# Patient Record
Sex: MALE | Race: WHITE | NOT HISPANIC OR LATINO | Employment: UNEMPLOYED | URBAN - NONMETROPOLITAN AREA
[De-identification: names, ages, dates, MRNs, and addresses within clinical notes are randomized per-mention and may not be internally consistent; named-entity substitution may affect disease eponyms.]

---

## 2023-12-14 ENCOUNTER — HOSPITAL ENCOUNTER (EMERGENCY)
Facility: HOSPITAL | Age: 48
Discharge: HOME | End: 2023-12-14
Attending: EMERGENCY MEDICINE
Payer: COMMERCIAL

## 2023-12-14 VITALS
OXYGEN SATURATION: 99 % | HEIGHT: 72 IN | SYSTOLIC BLOOD PRESSURE: 129 MMHG | BODY MASS INDEX: 33.86 KG/M2 | HEART RATE: 68 BPM | WEIGHT: 250 LBS | TEMPERATURE: 97 F | RESPIRATION RATE: 18 BRPM | DIASTOLIC BLOOD PRESSURE: 94 MMHG

## 2023-12-14 DIAGNOSIS — S61.022A LACERATION OF LEFT THUMB WITH FOREIGN BODY WITHOUT DAMAGE TO NAIL, INITIAL ENCOUNTER: Primary | ICD-10-CM

## 2023-12-14 PROCEDURE — 12002 RPR S/N/AX/GEN/TRNK2.6-7.5CM: CPT | Performed by: EMERGENCY MEDICINE

## 2023-12-14 PROCEDURE — 99282 EMERGENCY DEPT VISIT SF MDM: CPT | Performed by: EMERGENCY MEDICINE

## 2023-12-14 ASSESSMENT — PAIN - FUNCTIONAL ASSESSMENT
PAIN_FUNCTIONAL_ASSESSMENT: 0-10

## 2023-12-14 ASSESSMENT — COLUMBIA-SUICIDE SEVERITY RATING SCALE - C-SSRS
6. HAVE YOU EVER DONE ANYTHING, STARTED TO DO ANYTHING, OR PREPARED TO DO ANYTHING TO END YOUR LIFE?: NO
1. IN THE PAST MONTH, HAVE YOU WISHED YOU WERE DEAD OR WISHED YOU COULD GO TO SLEEP AND NOT WAKE UP?: NO
1. IN THE PAST MONTH, HAVE YOU WISHED YOU WERE DEAD OR WISHED YOU COULD GO TO SLEEP AND NOT WAKE UP?: NO
2. HAVE YOU ACTUALLY HAD ANY THOUGHTS OF KILLING YOURSELF?: NO
6. HAVE YOU EVER DONE ANYTHING, STARTED TO DO ANYTHING, OR PREPARED TO DO ANYTHING TO END YOUR LIFE?: NO
2. HAVE YOU ACTUALLY HAD ANY THOUGHTS OF KILLING YOURSELF?: NO

## 2023-12-14 ASSESSMENT — PAIN SCALES - GENERAL
PAINLEVEL_OUTOF10: 1
PAINLEVEL_OUTOF10: 1
PAINLEVEL_OUTOF10: 0 - NO PAIN

## 2023-12-14 ASSESSMENT — PAIN DESCRIPTION - DESCRIPTORS: DESCRIPTORS: TENDER

## 2023-12-14 ASSESSMENT — PAIN DESCRIPTION - ONSET: ONSET: SUDDEN

## 2023-12-14 ASSESSMENT — PAIN DESCRIPTION - PAIN TYPE: TYPE: ACUTE PAIN

## 2023-12-14 ASSESSMENT — PAIN DESCRIPTION - PROGRESSION: CLINICAL_PROGRESSION: NOT CHANGED

## 2023-12-14 ASSESSMENT — PAIN DESCRIPTION - FREQUENCY: FREQUENCY: CONSTANT/CONTINUOUS

## 2023-12-15 NOTE — ED PROVIDER NOTES
History:  Chief Complaint   Patient presents with    Finger Laceration     Shaving spoon with knife, + bleeding oozing.     HPI    History of Present Illness:  HPI    History of Present Illness:    Patient information was obtained from: Patient  History/exam Limitations: None  Patient resented to the Emergency Department: Private vehicle    Chief Complaint: Thumb laceration    HPI:  Kaleb Brush, 48 y.o. male presents today with: Patient presents with a complaint of a left thumb laceration.  Got it from a knife while trying to carve a spoon.  Tetanus is up-to-date.  Symptoms are mild and constant.    Past Medical History:    Past Medical History:   Diagnosis Date    Diabetes mellitus (CMS/[facility])     Familial hypercholesterolemia due to genetic disorder of apolipoprotein b     Hypertension        History reviewed. No pertinent surgical history.    No family history on file.    Allergies   Allergen Reactions    Ampicillin Hives    Penicillins Hives       Social History     Tobacco Use    Smoking status: Never    Smokeless tobacco: Never   Vaping Use    Vaping Use: Never used   Substance Use Topics    Drug use: Never     Past medical, surgical, social, and family history that was noted in the nursing note was also reviewed.    ROS:  Review of Systems    Physical Exam:  ED Triage Vitals [12/14/23 2242]   Temp Heart Rate Resp BP   36.1 °C (97 °F) 68 18 (!) 129/94      SpO2 Temp Source Heart Rate Source Patient Position   99 % Tympanic Monitor Lying      BP Location FiO2 (%)     Right arm --       Vitals: Blood pressure (!) 129/94, pulse 68, temperature 36.1 °C (97 °F), temperature source Tympanic, resp. rate 18, height 1.829 m (6'), weight 113 kg (250 lb), SpO2 99 %.  Vitals:    12/14/23 2242 12/14/23 2249   BP: (!) 129/94    BP Location: Right arm    Patient Position: Lying    Pulse: 68    Resp: 18    Temp: 36.1 °C (97 °F)    TempSrc: Tympanic    SpO2: 99%    Weight: 113 kg (250 lb) 113 kg (250 lb)   Height: 1.829 m  (6') 1.829 m (6')       Physical Exam  Vitals and nursing note reviewed.   Constitutional:       Appearance: Normal appearance.   Cardiovascular:      Rate and Rhythm: Normal rate and regular rhythm.      Pulses: Normal pulses.   Pulmonary:      Effort: Pulmonary effort is normal. No respiratory distress.   Musculoskeletal:         General: Normal range of motion.   Skin:     General: Skin is warm and dry.      Comments: 3 cm kind of curved laceration just going in front of the left thumb nail.   Neurological:      General: No focal deficit present.      Mental Status: He is alert and oriented to person, place, and time.   Psychiatric:         Mood and Affect: Mood normal.         ED Course:  Diagnostic test reviewed:  Diagnoses as of 12/14/23 2301   Laceration of left thumb with foreign body without damage to nail, initial encounter          Laceration Repair    Performed by: Wilian Baez DO  Authorized by: Wilian Baez DO    Consent:     Consent obtained:  Verbal    Risks discussed:  Infection and pain  Universal protocol:     Patient identity confirmed:  Verbally with patient and arm band  Laceration details:     Location:  Finger    Finger location:  L thumb    Length (cm):  3  Pre-procedure details:     Preparation:  Patient was prepped and draped in usual sterile fashion  Exploration:     Hemostasis achieved with:  Direct pressure    Wound extent: no nerve damage noted, no tendon damage noted and no vascular damage noted    Treatment:     Area cleansed with:  Soap and water    Amount of cleaning:  Standard    Irrigation solution:  Tap water    Visualized foreign bodies/material removed: no    Skin repair:     Repair method:  Tissue adhesive  Approximation:     Approximation:  Close  Repair type:     Repair type:  Simple  Post-procedure details:     Dressing:  Adhesive bandage    Procedure completion:  Tolerated          Administrated medications:  Medications - No data to display    New Prescriptions     No medications on file       Test ordered and reviewed:  Labs Reviewed - No data to display    No orders to display       ED Impression:  1. Laceration of left thumb with foreign body without damage to nail, initial encounter            Critical care time: 0 (Zero) minutes.    Medical Decision Making  Stable.  Wound closed with Dermabond.  Patient tolerated well.  Bleeding controlled.  Tetanus is currently up-to-date.  Will discharge home.  I will discharge the patient home.  Discussed the results of the exam and/or testing.  Written instructions provided.  Discussed return reasons, patient verbalizes understanding.  Stress follow-up with PCP.    Problems Addressed:  Laceration of left thumb with foreign body without damage to nail, initial encounter: acute illness or injury    Amount and/or Complexity of Data Reviewed  External Data Reviewed: notes.     Details: 9/12/2023 primary care note reviewed.    Primary care note reviewed    Diagnosis Ruled In: See clinical impression above.  Diagnoses Ruled Out: Sepsis and Trauma    History Obtained From: Patient    Test interpretations: See ED course if present.    Consideration for tests/treatments/admission not undertaken: None    Social Determinants of Health: None    MDM  Reviewed: nursing note, vitals and previous chart        No follow-ups on file.    Wilian Adair DO  12/14/2023  Attending Emergency Physician    Clinician of Record Attestation: I, Dr. Wilian Adair DO, am the primary clinician of record.    Please note this report has been produced using speech recognition software, and may contain errors related to that system - Including errors in grammar, punctuation, and spelling, as well as words and phrases that may be inappropriate. If there are any questions or concerns, please contact the dictating physician/physician assistant for clarification.                 Wilian Adair DO  12/14/23 4046

## 2025-01-31 ENCOUNTER — HOSPITAL ENCOUNTER (EMERGENCY)
Facility: HOSPITAL | Age: 50
Discharge: HOME | End: 2025-02-01
Attending: FAMILY MEDICINE
Payer: COMMERCIAL

## 2025-01-31 ENCOUNTER — APPOINTMENT (OUTPATIENT)
Dept: CARDIOLOGY | Facility: HOSPITAL | Age: 50
End: 2025-01-31
Payer: COMMERCIAL

## 2025-01-31 ENCOUNTER — APPOINTMENT (OUTPATIENT)
Dept: RADIOLOGY | Facility: HOSPITAL | Age: 50
End: 2025-01-31
Payer: COMMERCIAL

## 2025-01-31 DIAGNOSIS — R42 DIZZINESS: Primary | ICD-10-CM

## 2025-01-31 DIAGNOSIS — R11.2 NAUSEA AND VOMITING, UNSPECIFIED VOMITING TYPE: ICD-10-CM

## 2025-01-31 LAB
ALBUMIN SERPL BCP-MCNC: 4.8 G/DL (ref 3.4–5)
ALP SERPL-CCNC: 49 U/L (ref 33–120)
ALT SERPL W P-5'-P-CCNC: 39 U/L (ref 10–52)
ANION GAP SERPL CALC-SCNC: 16 MMOL/L (ref 10–20)
AST SERPL W P-5'-P-CCNC: 21 U/L (ref 9–39)
BASOPHILS # BLD AUTO: 0.03 X10*3/UL (ref 0–0.1)
BASOPHILS NFR BLD AUTO: 0.2 %
BILIRUB SERPL-MCNC: 0.7 MG/DL (ref 0–1.2)
BUN SERPL-MCNC: 13 MG/DL (ref 6–23)
CALCIUM SERPL-MCNC: 9.5 MG/DL (ref 8.6–10.3)
CHLORIDE SERPL-SCNC: 101 MMOL/L (ref 98–107)
CO2 SERPL-SCNC: 25 MMOL/L (ref 21–32)
CREAT SERPL-MCNC: 0.84 MG/DL (ref 0.5–1.3)
EGFRCR SERPLBLD CKD-EPI 2021: >90 ML/MIN/1.73M*2
EOSINOPHIL # BLD AUTO: 0.09 X10*3/UL (ref 0–0.7)
EOSINOPHIL NFR BLD AUTO: 0.6 %
ERYTHROCYTE [DISTWIDTH] IN BLOOD BY AUTOMATED COUNT: 11.8 % (ref 11.5–14.5)
FLUAV RNA RESP QL NAA+PROBE: NOT DETECTED
FLUBV RNA RESP QL NAA+PROBE: NOT DETECTED
GLUCOSE BLD MANUAL STRIP-MCNC: 159 MG/DL (ref 74–99)
GLUCOSE SERPL-MCNC: 148 MG/DL (ref 74–99)
HCT VFR BLD AUTO: 45.6 % (ref 41–52)
HGB BLD-MCNC: 15.4 G/DL (ref 13.5–17.5)
HOLD SPECIMEN: NORMAL
IMM GRANULOCYTES # BLD AUTO: 0.06 X10*3/UL (ref 0–0.7)
IMM GRANULOCYTES NFR BLD AUTO: 0.4 % (ref 0–0.9)
LIPASE SERPL-CCNC: 43 U/L (ref 9–82)
LYMPHOCYTES # BLD AUTO: 4.19 X10*3/UL (ref 1.2–4.8)
LYMPHOCYTES NFR BLD AUTO: 27.3 %
MAGNESIUM SERPL-MCNC: 1.99 MG/DL (ref 1.6–2.4)
MCH RBC QN AUTO: 29.5 PG (ref 26–34)
MCHC RBC AUTO-ENTMCNC: 33.8 G/DL (ref 32–36)
MCV RBC AUTO: 87 FL (ref 80–100)
MONOCYTES # BLD AUTO: 0.91 X10*3/UL (ref 0.1–1)
MONOCYTES NFR BLD AUTO: 5.9 %
NEUTROPHILS # BLD AUTO: 10.09 X10*3/UL (ref 1.2–7.7)
NEUTROPHILS NFR BLD AUTO: 65.6 %
NRBC BLD-RTO: 0 /100 WBCS (ref 0–0)
PLATELET # BLD AUTO: 351 X10*3/UL (ref 150–450)
POTASSIUM SERPL-SCNC: 4 MMOL/L (ref 3.5–5.3)
PROT SERPL-MCNC: 7.9 G/DL (ref 6.4–8.2)
RBC # BLD AUTO: 5.22 X10*6/UL (ref 4.5–5.9)
SARS-COV-2 RNA RESP QL NAA+PROBE: NOT DETECTED
SODIUM SERPL-SCNC: 138 MMOL/L (ref 136–145)
WBC # BLD AUTO: 15.4 X10*3/UL (ref 4.4–11.3)

## 2025-01-31 PROCEDURE — 2500000004 HC RX 250 GENERAL PHARMACY W/ HCPCS (ALT 636 FOR OP/ED): Performed by: FAMILY MEDICINE

## 2025-01-31 PROCEDURE — 74022 RADEX COMPL AQT ABD SERIES: CPT

## 2025-01-31 PROCEDURE — 74021 RADEX ABDOMEN 3+ VIEWS: CPT | Performed by: RADIOLOGY

## 2025-01-31 PROCEDURE — 83690 ASSAY OF LIPASE: CPT | Performed by: FAMILY MEDICINE

## 2025-01-31 PROCEDURE — 83735 ASSAY OF MAGNESIUM: CPT | Performed by: FAMILY MEDICINE

## 2025-01-31 PROCEDURE — 96361 HYDRATE IV INFUSION ADD-ON: CPT

## 2025-01-31 PROCEDURE — 84075 ASSAY ALKALINE PHOSPHATASE: CPT | Performed by: FAMILY MEDICINE

## 2025-01-31 PROCEDURE — 85025 COMPLETE CBC W/AUTO DIFF WBC: CPT | Performed by: FAMILY MEDICINE

## 2025-01-31 PROCEDURE — 96374 THER/PROPH/DIAG INJ IV PUSH: CPT

## 2025-01-31 PROCEDURE — 74021 RADEX ABDOMEN 3+ VIEWS: CPT

## 2025-01-31 PROCEDURE — 2500000002 HC RX 250 W HCPCS SELF ADMINISTERED DRUGS (ALT 637 FOR MEDICARE OP, ALT 636 FOR OP/ED)

## 2025-01-31 PROCEDURE — 36415 COLL VENOUS BLD VENIPUNCTURE: CPT | Performed by: FAMILY MEDICINE

## 2025-01-31 PROCEDURE — 93005 ELECTROCARDIOGRAM TRACING: CPT

## 2025-01-31 PROCEDURE — 2500000004 HC RX 250 GENERAL PHARMACY W/ HCPCS (ALT 636 FOR OP/ED)

## 2025-01-31 PROCEDURE — 87636 SARSCOV2 & INF A&B AMP PRB: CPT | Performed by: FAMILY MEDICINE

## 2025-01-31 PROCEDURE — 96375 TX/PRO/DX INJ NEW DRUG ADDON: CPT

## 2025-01-31 PROCEDURE — 99285 EMERGENCY DEPT VISIT HI MDM: CPT | Mod: 25 | Performed by: FAMILY MEDICINE

## 2025-01-31 PROCEDURE — 82947 ASSAY GLUCOSE BLOOD QUANT: CPT

## 2025-01-31 RX ORDER — FAMOTIDINE 10 MG/ML
20 INJECTION INTRAVENOUS ONCE
Status: COMPLETED | OUTPATIENT
Start: 2025-01-31 | End: 2025-01-31

## 2025-01-31 RX ORDER — METFORMIN HYDROCHLORIDE 500 MG/1
500 TABLET ORAL
COMMUNITY

## 2025-01-31 RX ORDER — MECLIZINE HYDROCHLORIDE 25 MG/1
25 TABLET ORAL ONCE
Status: COMPLETED | OUTPATIENT
Start: 2025-01-31 | End: 2025-01-31

## 2025-01-31 RX ORDER — DIAZEPAM 5 MG/ML
5 INJECTION, SOLUTION INTRAMUSCULAR; INTRAVENOUS ONCE
Status: COMPLETED | OUTPATIENT
Start: 2025-01-31 | End: 2025-01-31

## 2025-01-31 RX ORDER — ONDANSETRON HYDROCHLORIDE 2 MG/ML
INJECTION, SOLUTION INTRAVENOUS
Status: COMPLETED
Start: 2025-01-31 | End: 2025-01-31

## 2025-01-31 RX ORDER — MECLIZINE HYDROCHLORIDE 25 MG/1
TABLET ORAL
Status: COMPLETED
Start: 2025-01-31 | End: 2025-01-31

## 2025-01-31 RX ORDER — LISINOPRIL 10 MG/1
10 TABLET ORAL DAILY
COMMUNITY

## 2025-01-31 RX ORDER — FAMOTIDINE 10 MG/ML
INJECTION INTRAVENOUS
Status: COMPLETED
Start: 2025-01-31 | End: 2025-01-31

## 2025-01-31 RX ORDER — ATORVASTATIN CALCIUM 10 MG/1
10 TABLET, FILM COATED ORAL DAILY
COMMUNITY

## 2025-01-31 RX ORDER — ONDANSETRON HYDROCHLORIDE 2 MG/ML
4 INJECTION, SOLUTION INTRAVENOUS ONCE
Status: COMPLETED | OUTPATIENT
Start: 2025-01-31 | End: 2025-01-31

## 2025-01-31 RX ADMIN — SODIUM CHLORIDE 1000 ML: 0.9 INJECTION, SOLUTION INTRAVENOUS at 19:37

## 2025-01-31 RX ADMIN — ONDANSETRON HYDROCHLORIDE 4 MG: 2 INJECTION, SOLUTION INTRAVENOUS at 19:40

## 2025-01-31 RX ADMIN — FAMOTIDINE 20 MG: 10 INJECTION, SOLUTION INTRAVENOUS at 19:42

## 2025-01-31 RX ADMIN — DIAZEPAM 5 MG: 5 INJECTION, SOLUTION INTRAMUSCULAR; INTRAVENOUS at 22:52

## 2025-01-31 RX ADMIN — ONDANSETRON 4 MG: 2 INJECTION INTRAMUSCULAR; INTRAVENOUS at 19:40

## 2025-01-31 RX ADMIN — FAMOTIDINE 20 MG: 10 INJECTION INTRAVENOUS at 19:42

## 2025-01-31 RX ADMIN — MECLIZINE HYDROCHLORIDE 25 MG: 25 TABLET ORAL at 20:03

## 2025-01-31 ASSESSMENT — COLUMBIA-SUICIDE SEVERITY RATING SCALE - C-SSRS
2. HAVE YOU ACTUALLY HAD ANY THOUGHTS OF KILLING YOURSELF?: NO
1. IN THE PAST MONTH, HAVE YOU WISHED YOU WERE DEAD OR WISHED YOU COULD GO TO SLEEP AND NOT WAKE UP?: NO
6. HAVE YOU EVER DONE ANYTHING, STARTED TO DO ANYTHING, OR PREPARED TO DO ANYTHING TO END YOUR LIFE?: NO

## 2025-01-31 ASSESSMENT — PAIN DESCRIPTION - LOCATION: LOCATION: ABDOMEN

## 2025-01-31 ASSESSMENT — PAIN - FUNCTIONAL ASSESSMENT: PAIN_FUNCTIONAL_ASSESSMENT: 0-10

## 2025-01-31 ASSESSMENT — PAIN SCALES - GENERAL: PAINLEVEL_OUTOF10: 2

## 2025-02-01 ENCOUNTER — APPOINTMENT (OUTPATIENT)
Dept: RADIOLOGY | Facility: HOSPITAL | Age: 50
End: 2025-02-01
Payer: COMMERCIAL

## 2025-02-01 VITALS
HEIGHT: 72 IN | RESPIRATION RATE: 18 BRPM | SYSTOLIC BLOOD PRESSURE: 131 MMHG | DIASTOLIC BLOOD PRESSURE: 80 MMHG | HEART RATE: 93 BPM | TEMPERATURE: 97.6 F | BODY MASS INDEX: 34.54 KG/M2 | WEIGHT: 255 LBS | OXYGEN SATURATION: 97 %

## 2025-02-01 LAB
APPEARANCE UR: CLEAR
BILIRUB UR STRIP.AUTO-MCNC: NEGATIVE MG/DL
COLOR UR: YELLOW
GLUCOSE UR STRIP.AUTO-MCNC: NEGATIVE MG/DL
HOLD SPECIMEN: NORMAL
KETONES UR STRIP.AUTO-MCNC: ABNORMAL MG/DL
LEUKOCYTE ESTERASE UR QL STRIP.AUTO: NEGATIVE
NITRITE UR QL STRIP.AUTO: NEGATIVE
PH UR STRIP.AUTO: 6 [PH]
PROT UR STRIP.AUTO-MCNC: NEGATIVE MG/DL
RBC # UR STRIP.AUTO: NEGATIVE /UL
SP GR UR STRIP.AUTO: 1.01
UROBILINOGEN UR STRIP.AUTO-MCNC: <2 MG/DL

## 2025-02-01 PROCEDURE — 81003 URINALYSIS AUTO W/O SCOPE: CPT | Performed by: FAMILY MEDICINE

## 2025-02-01 PROCEDURE — 70498 CT ANGIOGRAPHY NECK: CPT | Performed by: STUDENT IN AN ORGANIZED HEALTH CARE EDUCATION/TRAINING PROGRAM

## 2025-02-01 PROCEDURE — 2550000001 HC RX 255 CONTRASTS: Performed by: FAMILY MEDICINE

## 2025-02-01 PROCEDURE — 70496 CT ANGIOGRAPHY HEAD: CPT | Performed by: STUDENT IN AN ORGANIZED HEALTH CARE EDUCATION/TRAINING PROGRAM

## 2025-02-01 PROCEDURE — 2500000004 HC RX 250 GENERAL PHARMACY W/ HCPCS (ALT 636 FOR OP/ED)

## 2025-02-01 PROCEDURE — 70498 CT ANGIOGRAPHY NECK: CPT

## 2025-02-01 RX ORDER — ONDANSETRON 4 MG/1
4 TABLET, ORALLY DISINTEGRATING ORAL ONCE
Status: COMPLETED | OUTPATIENT
Start: 2025-02-01 | End: 2025-02-01

## 2025-02-01 RX ORDER — MECLIZINE HYDROCHLORIDE 25 MG/1
25 TABLET ORAL 3 TIMES DAILY PRN
Qty: 20 TABLET | Refills: 0 | Status: SHIPPED | OUTPATIENT
Start: 2025-02-01 | End: 2025-02-11

## 2025-02-01 RX ORDER — ONDANSETRON 4 MG/1
4 TABLET, ORALLY DISINTEGRATING ORAL EVERY 8 HOURS PRN
Qty: 20 TABLET | Refills: 0 | Status: SHIPPED | OUTPATIENT
Start: 2025-02-01 | End: 2025-02-08

## 2025-02-01 RX ORDER — ONDANSETRON 4 MG/1
TABLET, ORALLY DISINTEGRATING ORAL
Status: COMPLETED
Start: 2025-02-01 | End: 2025-02-01

## 2025-02-01 RX ADMIN — IOHEXOL 75 ML: 350 INJECTION, SOLUTION INTRAVENOUS at 00:43

## 2025-02-01 RX ADMIN — ONDANSETRON 4 MG: 4 TABLET, ORALLY DISINTEGRATING ORAL at 02:05

## 2025-02-01 ASSESSMENT — PAIN - FUNCTIONAL ASSESSMENT: PAIN_FUNCTIONAL_ASSESSMENT: 0-10

## 2025-02-01 ASSESSMENT — PAIN SCALES - GENERAL: PAINLEVEL_OUTOF10: 0 - NO PAIN

## 2025-02-01 NOTE — ED PROVIDER NOTES
HPI   Chief Complaint   Patient presents with    Vomiting     Vomiting dizziness diarrhea, started this morning       49-year-old male with history of diabetes, hypertension, and hyperlipidemia comes the ED with complaint of dizziness with nausea/vomiting that began yesterday morning and continued today.  Patient reports symptoms continue to worsen and he got a quick movement or standing up made the room spinning admitted very nauseous along the fall down.  Patient reports that this has not happened to her before and did not know what to do and has been brought to the ED.  Patient does add that prior to the onset he was getting over a head cold with some nasal congestion.  Patient in the ED is alert, cooperative, appears anxious, uncomfortable, but in no distress.  Patient reports no other associate symptoms or complaints.      History provided by:  Spouse, medical records and patient   used: No            Patient History   Past Medical History:   Diagnosis Date    Diabetes mellitus ([facility])     Familial hypercholesterolemia due to genetic disorder of apolipoprotein b     Hypertension      History reviewed. No pertinent surgical history.  No family history on file.  Social History     Tobacco Use    Smoking status: Never    Smokeless tobacco: Never   Vaping Use    Vaping status: Never Used   Substance Use Topics    Alcohol use: Not Currently    Drug use: Never       Physical Exam   ED Triage Vitals   Temperature Heart Rate Respirations BP   01/31/25 1906 01/31/25 1906 01/31/25 1906 01/31/25 1906   36.5 °C (97.7 °F) 89 18 127/81      SpO2 Temp Source Heart Rate Source Patient Position   01/31/25 1906 01/31/25 1906 01/31/25 2000 --   97 % Temporal Monitor       BP Location FiO2 (%)     -- --             Physical Exam  Vitals and nursing note reviewed.   Constitutional:       General: He is not in acute distress.     Appearance: He is well-developed.   HENT:      Head: Normocephalic and atraumatic.    Eyes:      Conjunctiva/sclera: Conjunctivae normal.   Cardiovascular:      Rate and Rhythm: Normal rate and regular rhythm.      Pulses: Normal pulses.      Heart sounds: Normal heart sounds, S1 normal and S2 normal. No murmur heard.  Pulmonary:      Effort: Pulmonary effort is normal. No respiratory distress.      Breath sounds: Normal breath sounds.   Abdominal:      Palpations: Abdomen is soft.      Tenderness: There is no abdominal tenderness.   Musculoskeletal:         General: No swelling.      Cervical back: Neck supple.   Skin:     General: Skin is warm and dry.      Capillary Refill: Capillary refill takes less than 2 seconds.   Neurological:      General: No focal deficit present.      Mental Status: He is alert and oriented to person, place, and time.      GCS: GCS eye subscore is 4. GCS verbal subscore is 5. GCS motor subscore is 6.      Cranial Nerves: Cranial nerves 2-12 are intact.      Sensory: Sensation is intact.      Motor: Motor function is intact.      Coordination: Coordination is intact.      Gait: Gait is intact.      Comments: NIH 0  After treatment test of skew was appropriate  After treatment patient ambulated around the ED well with no difficulties   Psychiatric:         Mood and Affect: Mood normal.           ED Course & MDM   Diagnoses as of 02/01/25 0717   Dizziness   Nausea and vomiting, unspecified vomiting type                 No data recorded     [place] Coma Scale Score: 15 (01/31/25 1910 : Katelyn Chaudhry RN)                       Labs Reviewed   CBC WITH AUTO DIFFERENTIAL - Abnormal       Result Value    WBC 15.4 (*)     nRBC 0.0      RBC 5.22      Hemoglobin 15.4      Hematocrit 45.6      MCV 87      MCH 29.5      MCHC 33.8      RDW 11.8      Platelets 351      Neutrophils % 65.6      Immature Granulocytes %, Automated 0.4      Lymphocytes % 27.3      Monocytes % 5.9      Eosinophils % 0.6      Basophils % 0.2      Neutrophils Absolute 10.09 (*)     Immature Granulocytes  Absolute, Automated 0.06      Lymphocytes Absolute 4.19      Monocytes Absolute 0.91      Eosinophils Absolute 0.09      Basophils Absolute 0.03     COMPREHENSIVE METABOLIC PANEL - Abnormal    Glucose 148 (*)     Sodium 138      Potassium 4.0      Chloride 101      Bicarbonate 25      Anion Gap 16      Urea Nitrogen 13      Creatinine 0.84      eGFR >90      Calcium 9.5      Albumin 4.8      Alkaline Phosphatase 49      Total Protein 7.9      AST 21      Bilirubin, Total 0.7      ALT 39     URINALYSIS WITH REFLEX CULTURE AND MICROSCOPIC - Abnormal    Color, Urine Yellow      Appearance, Urine Clear      Specific Gravity, Urine 1.014      pH, Urine 6.0      Protein, Urine NEGATIVE      Glucose, Urine NEGATIVE      Blood, Urine NEGATIVE      Ketones, Urine 5 (TRACE) (*)     Bilirubin, Urine NEGATIVE      Urobilinogen, Urine <2.0      Nitrite, Urine NEGATIVE      Leukocyte Esterase, Urine NEGATIVE     POCT GLUCOSE - Abnormal    POCT Glucose 159 (*)    LIPASE - Normal    Lipase 43      Narrative:     Venipuncture immediately after or during the administration of Metamizole may lead to falsely low results. Testing should be performed immediately prior to Metamizole dosing.   SARS-COV-2 AND INFLUENZA A/B PCR - Normal    Flu A Result Not Detected      Flu B Result Not Detected      Coronavirus 2019, PCR Not Detected      Narrative:     This assay is an FDA-cleared, in vitro diagnostic nucleic acid amplification test for the qualitative detection and differentiation of SARS CoV-2/ Influenza A/B from nasopharyngeal specimens collected from individuals with signs and symptoms of respiratory tract infections, and has been validated for use at [facility]. Negative results do not preclude COVID-19/ Influenza A/B infections and should not be used as the sole basis for diagnosis, treatment, or other management decisions. Testing for SARS CoV-2 is recommended only for patients who meet current clinical  and/or epidemiological criteria defined by federal, state, or local public health directives.   MAGNESIUM - Normal    Magnesium 1.99     GRAY TOP    Extra Tube Hold for add-ons.     URINALYSIS WITH REFLEX CULTURE AND MICROSCOPIC    Narrative:     The following orders were created for panel order Urinalysis with Reflex Culture and Microscopic.  Procedure                               Abnormality         Status                     ---------                               -----------         ------                     Urinalysis with Reflex C...[[id number]]  Abnormal            Final result               Extra Urine Gray Tube[[id number]]                            In process                   Please view results for these tests on the individual orders.   EXTRA URINE GRAY TUBE   POCT GLUCOSE METER     CT angio head and neck w and wo IV contrast   Final Result   1. CTA images are somewhat degraded by suboptimal contrast bolus   timing with preferential enhancement of the intracranial venous   system instead of the arterial system. Within limits of the study no   large vessel occlusion is identified in the proximal intracranial   circulation. No significant stenosis is identified in the large   arteries of the neck.   2. Noncontrast CT images of the head do not demonstrate any evidence   of hyperdense intracranial hemorrhage, CT apparent transcortical   infarct, or other acute abnormality.        MACRO:   None        Signed by: Leti Perez 2/1/2025 1:16 AM   Dictation workstation:   [id number]      XR abdomen 2 views w chest 1 view   Final Result   1.  Nonobstructive bowel gas pattern.   2.  No acute cardiopulmonary process.        MACRO:   None        Signed by: Samreen Benoit 1/31/2025 9:47 PM   Dictation workstation:   [id number]          1920 -- NSR rate 93 nonspecific.  Normal axis.  Normal intervals.  No ST-T wave changes with no findings of STEMI. Unchanged compared to old EKG    Medical Decision  Making  Pt upon arrival to the ED appeared to be anxious and uncomfortable but in no distress with stable vitals.  Discussed with pt/spouse the presenting complaints and clinically findings.  Reviewed with pt/spouse the epic chart and counseled them on dizziness and appropriate approach to management/treatments.  After assessment and evaluation IV line was started, IV fluids given, labs sent, imaging ordered, EKG reviewed, given IV Zofran, oral meclizine, placed on cardiac monitor, and observed.  After treatment and a period of rest patient was reassessed and found to be feeling a little better, patient continued to report some dizziness but much improved, no longer felt nauseous, no new physical complaints, and vital signs stable.  Further imaging was ordered and patient given additional treatment with IV Valium in the ED.  Upon reevaluation once again after some rest and further treatment patient was not found to be feeling much better with resolution of his dizziness and again no recurrence of his nausea or new physical complaints.  Patient vital signs continue to be appropriate and final results were reviewed discussed with patient.  At this time he was educated use of a prescription provided for home and advised to contact his primary care doctor for follow-up and recheck in the next several days.  Patient also advised return to ED if he feels he has any recurrence of his dizziness or nausea for reassessment and reevaluation in the ED.  Patient stable and discharged home with family.      Amount and/or Complexity of Data Reviewed  External Data Reviewed: labs, radiology, ECG and notes.  Labs: ordered. Decision-making details documented in ED Course.  Radiology: ordered. Decision-making details documented in ED Course.  ECG/medicine tests: ordered and independent interpretation performed. Decision-making details documented in ED Course.    Risk  Prescription drug management.        Procedure  Procedures      Walt Shirley MD  02/01/25 0723

## 2025-02-04 LAB
ATRIAL RATE: 93 BPM
P AXIS: 23 DEGREES
P OFFSET: 196 MS
P ONSET: 141 MS
PR INTERVAL: 156 MS
Q ONSET: 219 MS
QRS COUNT: 15 BEATS
QRS DURATION: 108 MS
QT INTERVAL: 380 MS
QTC CALCULATION(BAZETT): 472 MS
QTC FREDERICIA: 440 MS
R AXIS: 39 DEGREES
T AXIS: 13 DEGREES
T OFFSET: 409 MS
VENTRICULAR RATE: 93 BPM
